# Patient Record
Sex: FEMALE | Race: WHITE | NOT HISPANIC OR LATINO | URBAN - METROPOLITAN AREA
[De-identification: names, ages, dates, MRNs, and addresses within clinical notes are randomized per-mention and may not be internally consistent; named-entity substitution may affect disease eponyms.]

---

## 2019-08-06 ENCOUNTER — TELEPHONE (OUTPATIENT)
Dept: OBSTETRICS AND GYNECOLOGY | Facility: CLINIC | Age: 29
End: 2019-08-06

## 2019-08-06 NOTE — TELEPHONE ENCOUNTER
----- Message from Daisha Marble sent at 8/6/2019  4:25 PM CDT -----  Contact: GURVINDER MUNIZ [[id number]]    Name of Who is Calling:  GURVINDER MUNIZ [[id number]]       What is the request in detail: Patient is requesting a call from staff in regards to scheduling  a new patient appointment and IUD removal   .....Please contact to further discuss and advise.     Can the clinic reply by [facility]: Yes     What Number to Call Back [facility]  [phone]

## 2019-08-21 ENCOUNTER — OFFICE VISIT (OUTPATIENT)
Dept: OBSTETRICS AND GYNECOLOGY | Facility: CLINIC | Age: 29
End: 2019-08-21
Payer: MEDICAID

## 2019-08-21 VITALS
SYSTOLIC BLOOD PRESSURE: 98 MMHG | WEIGHT: 128.5 LBS | HEIGHT: 65 IN | DIASTOLIC BLOOD PRESSURE: 60 MMHG | BODY MASS INDEX: 21.41 KG/M2

## 2019-08-21 DIAGNOSIS — A60.00 GENITAL HERPES SIMPLEX, UNSPECIFIED SITE: ICD-10-CM

## 2019-08-21 DIAGNOSIS — Z30.09 ENCOUNTER FOR OTHER GENERAL COUNSELING OR ADVICE ON CONTRACEPTION: ICD-10-CM

## 2019-08-21 DIAGNOSIS — Z30.011 ENCOUNTER FOR INITIAL PRESCRIPTION OF CONTRACEPTIVE PILLS: ICD-10-CM

## 2019-08-21 DIAGNOSIS — Z30.432 ENCOUNTER FOR IUD REMOVAL: Primary | ICD-10-CM

## 2019-08-21 PROBLEM — Z86.19 HISTORY OF HERPES GENITALIS: Status: ACTIVE | Noted: 2019-08-21

## 2019-08-21 LAB
B-HCG UR QL: NEGATIVE
CTP QC/QA: YES

## 2019-08-21 PROCEDURE — 99999 PR PBB SHADOW E&M-EST. PATIENT-LVL III: CPT | Mod: PBBFAC,,, | Performed by: STUDENT IN AN ORGANIZED HEALTH CARE EDUCATION/TRAINING PROGRAM

## 2019-08-21 PROCEDURE — 99203 PR OFFICE/OUTPT VISIT, NEW, LEVL III, 30-44 MIN: ICD-10-PCS | Mod: S$PBB,,, | Performed by: STUDENT IN AN ORGANIZED HEALTH CARE EDUCATION/TRAINING PROGRAM

## 2019-08-21 PROCEDURE — 99203 OFFICE O/P NEW LOW 30 MIN: CPT | Mod: S$PBB,,, | Performed by: STUDENT IN AN ORGANIZED HEALTH CARE EDUCATION/TRAINING PROGRAM

## 2019-08-21 PROCEDURE — 81025 URINE PREGNANCY TEST: CPT | Mod: PBBFAC,PO | Performed by: STUDENT IN AN ORGANIZED HEALTH CARE EDUCATION/TRAINING PROGRAM

## 2019-08-21 PROCEDURE — 99213 OFFICE O/P EST LOW 20 MIN: CPT | Mod: PBBFAC,PO | Performed by: STUDENT IN AN ORGANIZED HEALTH CARE EDUCATION/TRAINING PROGRAM

## 2019-08-21 PROCEDURE — 99999 PR PBB SHADOW E&M-EST. PATIENT-LVL III: ICD-10-PCS | Mod: PBBFAC,,, | Performed by: STUDENT IN AN ORGANIZED HEALTH CARE EDUCATION/TRAINING PROGRAM

## 2019-08-21 RX ORDER — NORETHINDRONE ACETATE AND ETHINYL ESTRADIOL .02; 1 MG/1; MG/1
1 TABLET ORAL DAILY
Qty: 30 TABLET | Refills: 11 | Status: SHIPPED | OUTPATIENT
Start: 2019-08-21 | End: 2020-08-20

## 2019-08-21 RX ORDER — VALACYCLOVIR HYDROCHLORIDE 500 MG/1
500 TABLET, FILM COATED ORAL DAILY
Qty: 30 TABLET | Refills: 0 | Status: SHIPPED | OUTPATIENT
Start: 2019-08-21 | End: 2019-09-20

## 2019-08-21 NOTE — PROGRESS NOTES
History & Physical  Gynecology      SUBJECTIVE:     Chief Complaint: Procedure (IUD Removal.)       History of Present Illness:  29 y.o. female  here for IUD removal. She has had her IUD in place since 2018 and she reports cramping and discomfort ever since. She has previously been on OCPs, and would like to restart them. She denies a history of migraines with aura and a history of blood clots. She states that she had an annual exam in October of last year. She declines STI testing. She also has a history of HSV2 and would like a refill of valtrex.      Review of patient's allergies indicates:  No Known Allergies    Past Medical History:   Diagnosis Date    Herpes simplex virus (HSV) infection     HSV2.     History reviewed. No pertinent surgical history.  OB History        0    Para   0    Term   0       0    AB   0    Living   0       SAB   0    TAB   0    Ectopic   0    Multiple   0    Live Births   0               Family History   Problem Relation Age of Onset    Breast cancer Neg Hx     Colon cancer Neg Hx     Ovarian cancer Neg Hx      Social History     Tobacco Use    Smoking status: Never Smoker    Smokeless tobacco: Never Used   Substance Use Topics    Alcohol use: Yes     Comment: Socially.    Drug use: Not Currently       Current Outpatient Medications   Medication Sig    multivit with minerals/lutein (MULTIVITAMIN 50 PLUS ORAL) multivitamin    norethindrone-ethinyl estradiol (MICROGESTIN 1/20) 1-20 mg-mcg per tablet Take 1 tablet by mouth once daily.    valACYclovir (VALTREX) 500 MG tablet Take 1 tablet (500 mg total) by mouth once daily.     No current facility-administered medications for this visit.          Review of Systems:  Review of Systems   Constitutional: Negative for fever.   Respiratory: Negative for shortness of breath.    Cardiovascular: Negative for chest pain.   Gastrointestinal: Negative for abdominal pain, nausea and vomiting.   Genitourinary:  Positive for pelvic pain. Negative for dysuria, menstrual problem, vaginal bleeding, vaginal discharge, vaginal pain and vaginal odor.   Neurological: Negative for headaches.        OBJECTIVE:     Physical Exam:  Physical Exam   Constitutional: She appears well-developed and well-nourished. No distress.   HENT:   Head: Normocephalic and atraumatic.   Eyes: Conjunctivae are normal.   Neck: Normal range of motion.   Cardiovascular: Normal rate.   Pulmonary/Chest: Effort normal. No respiratory distress.   Abdominal: Soft. There is no tenderness.   Genitourinary: Vagina normal and uterus normal. There is no rash, tenderness, lesion or injury on the right labia. There is no rash, tenderness, lesion or injury on the left labia. Uterus is not enlarged and not tender. Cervix exhibits no motion tenderness. Right adnexum displays no mass, no tenderness and no fullness. Left adnexum displays no mass, no tenderness and no fullness. No bleeding in the vagina. No vaginal discharge found.   Genitourinary Comments: External genitalia: Normal  Urethral: Nontender, no masses  Urethral meatus: Normal  Bladder: Non-tender   Musculoskeletal: Normal range of motion.   Neurological: She is alert.   Skin: Skin is warm and dry. She is not diaphoretic.   Vitals reviewed.        ASSESSMENT:       ICD-10-CM ICD-9-CM    1. Encounter for IUD removal Z30.432 V25.12    2. Encounter for other general counseling or advice on contraception Z30.09 V25.09 POCT urine pregnancy   3. Encounter for initial prescription of contraceptive pills Z30.011 V25.01 norethindrone-ethinyl estradiol (MICROGESTIN 1/20) 1-20 mg-mcg per tablet   4. Genital herpes simplex, unspecified site A60.00 054.10 valACYclovir (VALTREX) 500 MG tablet          Plan:      -- IUD removed. See procedure note for details.  -- Pap due in March per [facility].  -- RTC in March 2020 for annual well woman exam.        Janki Vale MD  OBGYN PGY-2

## 2019-08-21 NOTE — PROCEDURES
Procedures     PRE-IUD REMOVAL COUNSELING:  The patient was advised of minimal risks of bleeding and pain and she agrees to proceed.    PROCEDURE:  TIME OUT PERFORMED.  IUD strings were not able to be visualized. A cytobrush was used to attempt to tease the strings out with no success. A adelina was then used to grasp the end of the device and the entire IUD was removed with gentle traction. The patient tolerated the procedure well      POST IUD REMOVAL COUNSELING:  Expect period-like flow to occur after Mirena IUD removal and periods to return to pre-IUD pattern.  Manage post IUD removal cramping with NSAIDs, Tylenol or Rx per [employer].    POST IUD REMOVAL CONTRACEPTION: OCPs    Counseling lasted approximately 15 minutes and all her questions were answered.    FOLLOW-UP: In March 2020 for annual gyn exam or prn.    Janki Vale MD  OBGYN PGY-2